# Patient Record
Sex: FEMALE | Race: WHITE | NOT HISPANIC OR LATINO | Employment: UNEMPLOYED | URBAN - METROPOLITAN AREA
[De-identification: names, ages, dates, MRNs, and addresses within clinical notes are randomized per-mention and may not be internally consistent; named-entity substitution may affect disease eponyms.]

---
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
## 2018-01-09 ENCOUNTER — HOSPITAL ENCOUNTER (EMERGENCY)
Facility: MEDICAL CENTER | Age: 18
End: 2018-01-09
Attending: EMERGENCY MEDICINE
Payer: COMMERCIAL

## 2018-01-09 ENCOUNTER — APPOINTMENT (OUTPATIENT)
Dept: RADIOLOGY | Facility: MEDICAL CENTER | Age: 18
End: 2018-01-09
Attending: EMERGENCY MEDICINE
Payer: COMMERCIAL

## 2018-01-09 VITALS
HEIGHT: 65 IN | SYSTOLIC BLOOD PRESSURE: 115 MMHG | TEMPERATURE: 98.3 F | BODY MASS INDEX: 22.44 KG/M2 | OXYGEN SATURATION: 96 % | RESPIRATION RATE: 16 BRPM | HEART RATE: 62 BPM | DIASTOLIC BLOOD PRESSURE: 66 MMHG | WEIGHT: 134.7 LBS

## 2018-01-09 DIAGNOSIS — N12 PYELONEPHRITIS: ICD-10-CM

## 2018-01-09 DIAGNOSIS — R10.9 BILATERAL FLANK PAIN: ICD-10-CM

## 2018-01-09 LAB
APPEARANCE UR: ABNORMAL
BACTERIA #/AREA URNS HPF: ABNORMAL /HPF
BILIRUB UR QL STRIP.AUTO: NEGATIVE
COLOR UR: ABNORMAL
CULTURE IF INDICATED INDCX: YES UA CULTURE
GLUCOSE UR STRIP.AUTO-MCNC: NEGATIVE MG/DL
HCG UR QL: NEGATIVE
KETONES UR STRIP.AUTO-MCNC: NEGATIVE MG/DL
LEUKOCYTE ESTERASE UR QL STRIP.AUTO: NEGATIVE
MICRO URNS: ABNORMAL
NITRITE UR QL STRIP.AUTO: NEGATIVE
PH UR STRIP.AUTO: 6 [PH]
PROT UR QL STRIP: >=500 MG/DL
RBC # URNS HPF: >150 /HPF
RBC UR QL AUTO: ABNORMAL
SP GR UR REFRACTOMETRY: 1.03
SP GR UR STRIP.AUTO: 1.03
UROBILINOGEN UR STRIP.AUTO-MCNC: NORMAL MG/DL
WBC #/AREA URNS HPF: ABNORMAL /HPF

## 2018-01-09 PROCEDURE — 81025 URINE PREGNANCY TEST: CPT

## 2018-01-09 PROCEDURE — 700102 HCHG RX REV CODE 250 W/ 637 OVERRIDE(OP): Performed by: EMERGENCY MEDICINE

## 2018-01-09 PROCEDURE — 87086 URINE CULTURE/COLONY COUNT: CPT

## 2018-01-09 PROCEDURE — 81001 URINALYSIS AUTO W/SCOPE: CPT

## 2018-01-09 PROCEDURE — A9270 NON-COVERED ITEM OR SERVICE: HCPCS | Performed by: EMERGENCY MEDICINE

## 2018-01-09 PROCEDURE — 99284 EMERGENCY DEPT VISIT MOD MDM: CPT

## 2018-01-09 PROCEDURE — 74176 CT ABD & PELVIS W/O CONTRAST: CPT

## 2018-01-09 PROCEDURE — 700111 HCHG RX REV CODE 636 W/ 250 OVERRIDE (IP): Performed by: EMERGENCY MEDICINE

## 2018-01-09 RX ORDER — NAPROXEN 500 MG/1
500 TABLET ORAL 2 TIMES DAILY WITH MEALS
Qty: 20 TAB | Refills: 0 | Status: SHIPPED | OUTPATIENT
Start: 2018-01-09 | End: 2018-01-16

## 2018-01-09 RX ORDER — SULFAMETHOXAZOLE AND TRIMETHOPRIM 800; 160 MG/1; MG/1
1 TABLET ORAL 2 TIMES DAILY
Qty: 20 TAB | Refills: 0 | Status: SHIPPED | OUTPATIENT
Start: 2018-01-09 | End: 2018-01-16

## 2018-01-09 RX ORDER — SULFAMETHOXAZOLE AND TRIMETHOPRIM 800; 160 MG/1; MG/1
1 TABLET ORAL ONCE
Status: COMPLETED | OUTPATIENT
Start: 2018-01-09 | End: 2018-01-09

## 2018-01-09 RX ORDER — OXYCODONE HYDROCHLORIDE AND ACETAMINOPHEN 5; 325 MG/1; MG/1
1 TABLET ORAL ONCE
Status: COMPLETED | OUTPATIENT
Start: 2018-01-09 | End: 2018-01-09

## 2018-01-09 RX ORDER — ONDANSETRON 4 MG/1
4 TABLET, ORALLY DISINTEGRATING ORAL ONCE
Status: COMPLETED | OUTPATIENT
Start: 2018-01-09 | End: 2018-01-09

## 2018-01-09 RX ORDER — PHENAZOPYRIDINE HYDROCHLORIDE 200 MG/1
200 TABLET, FILM COATED ORAL 3 TIMES DAILY PRN
Qty: 6 TAB | Refills: 0 | Status: SHIPPED | OUTPATIENT
Start: 2018-01-09 | End: 2018-01-16

## 2018-01-09 RX ADMIN — SULFAMETHOXAZOLE AND TRIMETHOPRIM 1 TABLET: 800; 160 TABLET ORAL at 19:47

## 2018-01-09 RX ADMIN — OXYCODONE HYDROCHLORIDE AND ACETAMINOPHEN 1 TABLET: 5; 325 TABLET ORAL at 19:47

## 2018-01-09 RX ADMIN — ONDANSETRON 4 MG: 4 TABLET, ORALLY DISINTEGRATING ORAL at 19:47

## 2018-01-09 ASSESSMENT — PAIN SCALES - GENERAL: PAINLEVEL_OUTOF10: 7

## 2018-01-10 NOTE — DISCHARGE INSTRUCTIONS
Return immediately to the Emergency Department if you experience continuing or worsening discomfort in your abdomen or back, fever, chest pain, difficulty breathing or any other new or worsening symptoms.         Pyelonephritis, Adult  Pyelonephritis is a kidney infection. In general, there are 2 main types of pyelonephritis:  · Infections that come on quickly without any warning (acute pyelonephritis).  · Infections that persist for a long period of time (chronic pyelonephritis).  CAUSES   Two main causes of pyelonephritis are:  · Bacteria traveling from the bladder to the kidney. This is a problem especially in pregnant women. The urine in the bladder can become filled with bacteria from multiple causes, including:  ¨ Inflammation of the prostate gland (prostatitis).  ¨ Sexual intercourse in females.  ¨ Bladder infection (cystitis).  · Bacteria traveling from the bloodstream to the tissue part of the kidney.  Problems that may increase your risk of getting a kidney infection include:  · Diabetes.  · Kidney stones or bladder stones.  · Cancer.  · Catheters placed in the bladder.  · Other abnormalities of the kidney or ureter.  SYMPTOMS   · Abdominal pain.  · Pain in the side or flank area.  · Fever.  · Chills.  · Upset stomach.  · Blood in the urine (dark urine).  · Frequent urination.  · Strong or persistent urge to urinate.  · Burning or stinging when urinating.  DIAGNOSIS   Your caregiver may diagnose your kidney infection based on your symptoms. A urine sample may also be taken.  TREATMENT   In general, treatment depends on how severe the infection is.   · If the infection is mild and caught early, your caregiver may treat you with oral antibiotics and send you home.  · If the infection is more severe, the bacteria may have gotten into the bloodstream. This will require intravenous (IV) antibiotics and a hospital stay. Symptoms may include:  ¨ High fever.  ¨ Severe flank pain.  ¨ Shaking chills.  · Even after a  hospital stay, your caregiver may require you to be on oral antibiotics for a period of time.  · Other treatments may be required depending upon the cause of the infection.  HOME CARE INSTRUCTIONS   · Take your antibiotics as directed. Finish them even if you start to feel better.  · Make an appointment to have your urine checked to make sure the infection is gone.  · Drink enough fluids to keep your urine clear or pale yellow.  · Take medicines for the bladder if you have urgency and frequency of urination as directed by your caregiver.  SEEK IMMEDIATE MEDICAL CARE IF:   · You have a fever or persistent symptoms for more than 2-3 days.  · You have a fever and your symptoms suddenly get worse.  · You are unable to take your antibiotics or fluids.  · You develop shaking chills.  · You experience extreme weakness or fainting.  · There is no improvement after 2 days of treatment.  MAKE SURE YOU:  · Understand these instructions.  · Will watch your condition.  · Will get help right away if you are not doing well or get worse.     This information is not intended to replace advice given to you by your health care provider. Make sure you discuss any questions you have with your health care provider.     Document Released: 12/18/2006 Document Revised: 06/18/2013 Document Reviewed: 05/23/2012  [employer] Patient Education ©2016 [employer]

## 2018-01-10 NOTE — ED NOTES
Pt to triage c/o lower abd pain and blood in urine. Pt reports 3 UTIs within the last month. (+) Nausea  Pt advised to return to triage nurse for any changes or concerns.

## 2018-01-10 NOTE — ED NOTES
PT assessment complete. Agree with triage note. Pt c/o lower abd pain and bilateral flank pain. Pt has had multiple UTIs. No n/v/d or fever. PT in gown. Educated on NPO status until cleared by MD. Pt is alert, active, age appropriate, and NAD. No needs. Will continue to monitor.

## 2018-01-10 NOTE — ED PROVIDER NOTES
Patient is a 17-year-old female who comes in for dysuria. She is diagnosed with urinary tract infection pyelonephritis. She is tender to myself to follow-up CT of the abdomen to ensure that she does not have renal calculi. CT returns demonstrates no evidence of renal cocci. Therefore patient is reassured, provided with prescriptions for antibiotics, given strict return precautions. Patient is discharged home in stable condition.    Afshan Griffiths

## 2018-01-10 NOTE — ED PROVIDER NOTES
ED Provider Note    CHIEF COMPLAINT  Chief Complaint   Patient presents with   • Blood in Urine       HPI  Bethany Moreau is a 17 y.o. female who presents For evaluation of blood in her urine and bilateral flank pain, she is a history of recurring UTIs with multiple in the past month, she has been evaluated by a urologist in the past and told her she has reflux but told her there is nothing that can be done for this. The patient is concerned today that she could've kidney stones given the severe back pain and her family history of kidney stones. No fever, no vomiting, she started with symptoms yesterday with just some dysuria and progress today developed the back pain and hematuria. She also describes a suprapubic abdominal pain with this.    REVIEW OF SYSTEMS  Negative for fever, rash, chest pain, dyspnea, vomiting, diarrhea, headache, focal weakness, focal numbness, focal tingling. All other systems are negative.     PAST MEDICAL HISTORY  Past Medical History:   Diagnosis Date   • ASTHMA     exercise induced   • Dental disorder     wearing  braces   • Dislocation of knee    • UTI (urinary tract infection)     previous hx        FAMILY HISTORY  No family history on file.    SOCIAL HISTORY  Social History   Substance Use Topics   • Smoking status: Never Smoker   • Smokeless tobacco: Never Used   • Alcohol use No       SURGICAL HISTORY  Past Surgical History:   Procedure Laterality Date   • SHOULDER ARTHROSCOPY Left 11/18/2016    Procedure: SHOULDER ARTHROSCOPY, Poss Arthrotomy, Subacromial Decompression, Distal Clavicle Excision, Possible Rotator Cuff Repair, Possible Biceps Tenotomy;  Surgeon: Jeremy Sánchez M.D.;  Location: SURGERY [facility];  Service:    • DENTAL EXTRACTION(S)      wisdom teeth   • KNEE ARTHROSCOPY Left     meniscus tear x 2 left knee   • TONSILLECTOMY AND ADENOIDECTOMY         CURRENT MEDICATIONS  I personally reviewed the medication list in the charting documentation.  "    ALLERGIES  No Known Allergies    MEDICAL RECORD  I have reviewed patient's medical record and pertinent results are listed above.      PHYSICAL EXAM  VITAL SIGNS: /83   Pulse 80   Temp 36.8 °C (98.3 °F)   Resp 16   Ht 1.651 m (5' 5\")   Wt 61.1 kg (134 lb 11.2 oz)   SpO2 98%   BMI 22.42 kg/m²    Constitutional: Well appearing patient in no acute distress.  Not toxic, nor ill in appearance.  HENT: Mucus membranes moist.    Eyes: No scleral icterus. Normal conjunctiva   Neck: Supple, comfortable, nonpainful range of motion.   Cardiovascular: Regular heart rate and rhythm.   Thorax & Lungs: Chest is nontender.  Lungs are clear to auscultation with good air movement bilaterally.  No wheeze, rhonchi, nor rales.   Abdomen: Soft, moderate suprapubic tenderness, no rebound, no guarding. Bilateral CVA tenderness.   Skin: Warm, dry. No rash appreciated  Extremities/Musculoskeletal: No sign of trauma. No asymmetric calf tenderness, erythema or edema. Normal range of motion   Neurologic: Alert & oriented. No focal deficits observed.   Psychiatric: Normal affect appropriate for the clinical situation.    DIAGNOSTIC STUDIES / PROCEDURES    LABS  Results for orders placed or performed during the hospital encounter of 01/09/18   URINALYSIS,CULTURE IF INDICATED   Result Value Ref Range    Micro Urine Req Microscopic     Color Red     Character Bloody (A)     Specific Gravity 1.030 <1.035    Ph 6.0 5.0 - 8.0    Glucose Negative Negative mg/dL    Ketones Negative Negative mg/dL    Protein >=500 (A) Negative mg/dL    Bilirubin Negative Negative    Urobilinogen, Urine Normal Negative    Nitrite Negative Negative    Leukocyte Esterase Negative Negative    Occult Blood Large (A) Negative    Culture Indicated Yes UA Culture   URINE MICROSCOPIC (W/UA)   Result Value Ref Range    WBC 20-50 (A) /hpf    RBC >150 (A) /hpf    Bacteria Few (A) None /hpf         RADIOLOGY  CT-RENAL COLIC EVALUATION(A/P W/O)   Final Result       "   1. No urinary tract calculus identified. No renal collecting system in dilatation.      2. No evidence of inflammatory change in the abdomen or pelvis.  The study is however limited due to nonuse of intravenous contrast            COURSE & MEDICAL DECISION MAKING  I have reviewed any medical record information, laboratory studies and radiographic results as noted above.  Differential diagnoses includes: Pyelonephritis, ureterolithiasis    Encounter Summary: This is a 17 y.o. female with signs and symptoms consistent with pyelonephritis, urinalysis supports this with large WBC and some bacteria. She is concerned about kidney stones, although I think it's less likely I will obtain a CT scan to rule this out as the patient is pretty adamant that that's what she has. In the setting of pyelonephritis, a obstructive pathology like ureterolithiasis would be quite concerning so the CT scan will be obtained. In the meantime I will treat her with Bactrim as well as Percocet here in the emergency department and Zofran so she doesn't develop vomiting. If the CT scan is negative for stones, she'll be discharged to follow-up with a urologist as UTIs are recurring. Strict return instructions discussed.      DISPOSITION: Discharge home in stable condition      FINAL IMPRESSION  1. Pyelonephritis    2. Bilateral flank pain           This dictation was created using voice recognition software. The accuracy of the dictation is limited to the abilities of the software. I expect there may be some errors of grammar and possibly content. The nursing notes were reviewed and certain aspects of this information were incorporated into this note.    Electronically signed by: Manny Mckinney, 1/9/2018 7:41 PM

## 2018-01-11 ENCOUNTER — PATIENT OUTREACH (OUTPATIENT)
Dept: HEALTH INFORMATION MANAGEMENT | Facility: OTHER | Age: 18
End: 2018-01-11

## 2018-01-11 LAB
BACTERIA UR CULT: NORMAL
SIGNIFICANT IND 70042: NORMAL
SITE SITE: NORMAL
SOURCE SOURCE: NORMAL

## 2018-01-12 ENCOUNTER — HOSPITAL ENCOUNTER (EMERGENCY)
Facility: MEDICAL CENTER | Age: 18
End: 2018-01-12
Attending: EMERGENCY MEDICINE
Payer: COMMERCIAL

## 2018-01-12 ENCOUNTER — APPOINTMENT (OUTPATIENT)
Dept: RADIOLOGY | Facility: MEDICAL CENTER | Age: 18
End: 2018-01-12
Attending: EMERGENCY MEDICINE
Payer: COMMERCIAL

## 2018-01-12 VITALS
HEART RATE: 64 BPM | TEMPERATURE: 98.3 F | HEIGHT: 65 IN | RESPIRATION RATE: 16 BRPM | OXYGEN SATURATION: 97 % | BODY MASS INDEX: 23.07 KG/M2 | WEIGHT: 138.45 LBS | DIASTOLIC BLOOD PRESSURE: 61 MMHG | SYSTOLIC BLOOD PRESSURE: 109 MMHG

## 2018-01-12 DIAGNOSIS — R10.84 GENERALIZED ABDOMINAL PAIN: ICD-10-CM

## 2018-01-12 DIAGNOSIS — N93.8 DYSFUNCTIONAL UTERINE BLEEDING: ICD-10-CM

## 2018-01-12 DIAGNOSIS — N93.9 VAGINAL BLEEDING: ICD-10-CM

## 2018-01-12 LAB
ALBUMIN SERPL BCP-MCNC: 4.4 G/DL (ref 3.2–4.9)
ALBUMIN/GLOB SERPL: 1.3 G/DL
ALP SERPL-CCNC: 44 U/L (ref 30–99)
ALT SERPL-CCNC: 15 U/L (ref 2–50)
ANION GAP SERPL CALC-SCNC: 6 MMOL/L (ref 0–11.9)
AST SERPL-CCNC: 21 U/L (ref 12–45)
B-HCG SERPL-ACNC: <0.6 MIU/ML (ref 0–5)
BASOPHILS # BLD AUTO: 0.4 % (ref 0–1.8)
BASOPHILS # BLD: 0.02 K/UL (ref 0–0.12)
BILIRUB SERPL-MCNC: 0.7 MG/DL (ref 0.1–1.5)
BUN SERPL-MCNC: 11 MG/DL (ref 8–22)
CALCIUM SERPL-MCNC: 9.2 MG/DL (ref 8.5–10.5)
CHLORIDE SERPL-SCNC: 106 MMOL/L (ref 96–112)
CO2 SERPL-SCNC: 23 MMOL/L (ref 20–33)
CREAT SERPL-MCNC: 0.83 MG/DL (ref 0.5–1.4)
EOSINOPHIL # BLD AUTO: 0.06 K/UL (ref 0–0.51)
EOSINOPHIL NFR BLD: 1.1 % (ref 0–6.9)
ERYTHROCYTE [DISTWIDTH] IN BLOOD BY AUTOMATED COUNT: 39.8 FL (ref 35.9–50)
GLOBULIN SER CALC-MCNC: 3.4 G/DL (ref 1.9–3.5)
GLUCOSE SERPL-MCNC: 94 MG/DL (ref 65–99)
HCT VFR BLD AUTO: 47.3 % (ref 37–47)
HGB BLD-MCNC: 15.9 G/DL (ref 12–16)
IMM GRANULOCYTES # BLD AUTO: 0.01 K/UL (ref 0–0.11)
IMM GRANULOCYTES NFR BLD AUTO: 0.2 % (ref 0–0.9)
INR PPP: 0.98 (ref 0.87–1.13)
LIPASE SERPL-CCNC: 46 U/L (ref 11–82)
LYMPHOCYTES # BLD AUTO: 1.76 K/UL (ref 1–4.8)
LYMPHOCYTES NFR BLD: 32.4 % (ref 22–41)
MCH RBC QN AUTO: 29.1 PG (ref 27–33)
MCHC RBC AUTO-ENTMCNC: 33.6 G/DL (ref 33.6–35)
MCV RBC AUTO: 86.6 FL (ref 81.4–97.8)
MONOCYTES # BLD AUTO: 0.47 K/UL (ref 0–0.85)
MONOCYTES NFR BLD AUTO: 8.7 % (ref 0–13.4)
NEUTROPHILS # BLD AUTO: 3.11 K/UL (ref 2–7.15)
NEUTROPHILS NFR BLD: 57.2 % (ref 44–72)
NRBC # BLD AUTO: 0 K/UL
NRBC BLD-RTO: 0 /100 WBC
NUMBER OF RH DOSES IND 8505RD: NORMAL
PLATELET # BLD AUTO: 313 K/UL (ref 164–446)
PMV BLD AUTO: 9.8 FL (ref 9–12.9)
POTASSIUM SERPL-SCNC: 4.3 MMOL/L (ref 3.6–5.5)
PROT SERPL-MCNC: 7.8 G/DL (ref 6–8.2)
PROTHROMBIN TIME: 12.7 SEC (ref 12–14.6)
RBC # BLD AUTO: 5.46 M/UL (ref 4.2–5.4)
RH BLD: NORMAL
SODIUM SERPL-SCNC: 135 MMOL/L (ref 135–145)
WBC # BLD AUTO: 5.4 K/UL (ref 4.8–10.8)

## 2018-01-12 PROCEDURE — 700111 HCHG RX REV CODE 636 W/ 250 OVERRIDE (IP): Performed by: EMERGENCY MEDICINE

## 2018-01-12 PROCEDURE — 86901 BLOOD TYPING SEROLOGIC RH(D): CPT

## 2018-01-12 PROCEDURE — 96375 TX/PRO/DX INJ NEW DRUG ADDON: CPT

## 2018-01-12 PROCEDURE — 85025 COMPLETE CBC W/AUTO DIFF WBC: CPT

## 2018-01-12 PROCEDURE — 99284 EMERGENCY DEPT VISIT MOD MDM: CPT

## 2018-01-12 PROCEDURE — 96374 THER/PROPH/DIAG INJ IV PUSH: CPT

## 2018-01-12 PROCEDURE — 76830 TRANSVAGINAL US NON-OB: CPT

## 2018-01-12 PROCEDURE — 84702 CHORIONIC GONADOTROPIN TEST: CPT

## 2018-01-12 PROCEDURE — 700105 HCHG RX REV CODE 258: Performed by: EMERGENCY MEDICINE

## 2018-01-12 PROCEDURE — 85610 PROTHROMBIN TIME: CPT

## 2018-01-12 PROCEDURE — 83690 ASSAY OF LIPASE: CPT

## 2018-01-12 PROCEDURE — 80053 COMPREHEN METABOLIC PANEL: CPT

## 2018-01-12 PROCEDURE — 36415 COLL VENOUS BLD VENIPUNCTURE: CPT

## 2018-01-12 PROCEDURE — 96361 HYDRATE IV INFUSION ADD-ON: CPT

## 2018-01-12 RX ORDER — SODIUM CHLORIDE 9 MG/ML
INJECTION, SOLUTION INTRAVENOUS CONTINUOUS
Status: DISCONTINUED | OUTPATIENT
Start: 2018-01-12 | End: 2018-01-12 | Stop reason: HOSPADM

## 2018-01-12 RX ORDER — ONDANSETRON 2 MG/ML
4 INJECTION INTRAMUSCULAR; INTRAVENOUS ONCE
Status: COMPLETED | OUTPATIENT
Start: 2018-01-12 | End: 2018-01-12

## 2018-01-12 RX ORDER — HYDROMORPHONE HYDROCHLORIDE 2 MG/ML
1 INJECTION, SOLUTION INTRAMUSCULAR; INTRAVENOUS; SUBCUTANEOUS ONCE
Status: COMPLETED | OUTPATIENT
Start: 2018-01-12 | End: 2018-01-12

## 2018-01-12 RX ADMIN — SODIUM CHLORIDE: 9 INJECTION, SOLUTION INTRAVENOUS at 14:00

## 2018-01-12 RX ADMIN — HYDROMORPHONE HYDROCHLORIDE 1 MG: 2 INJECTION INTRAMUSCULAR; INTRAVENOUS; SUBCUTANEOUS at 13:58

## 2018-01-12 RX ADMIN — ONDANSETRON 4 MG: 2 INJECTION INTRAMUSCULAR; INTRAVENOUS at 13:56

## 2018-01-12 ASSESSMENT — LIFESTYLE VARIABLES: DO YOU DRINK ALCOHOL: NO

## 2018-01-12 NOTE — ED NOTES
Bethany Moreau 17 y.o. female     Chief Complaint   Patient presents with   • Abdominal Pain     Seen here 1/9 for blood in urine.  Dx with kidney infection, referred to urlolgy.  Seen by [facility], no infection present per urologist.  Referred to GI, who reviewed CT and findings uremarkable.       • Vaginal Bleeding     Clots - started this AM.  Sent for pelvic workup.      Pt returned to [facility] and educated on triage process.  Advised to notify RN with changes or concerns.

## 2018-01-12 NOTE — PROGRESS NOTES
"01/11/2018 1636 - Discharge Outreach - received call from patient's mother. States she has been treated 3 times recently for UTI. Was referred to urologist  - saw urology PA today and he did a dipstick and said she doesn't have a UTI and should stop the abx. Mother states patient still in a lot of pain and \"peeing blood\". Encouraged mother to call urology office to discuss. Support given. States she is also having GI problems. Recommended that patient establish with PCP. No other questions.   "

## 2018-01-12 NOTE — ED PROVIDER NOTES
ED Provider Note  CHIEF COMPLAINT  Chief Complaint   Patient presents with   • Abdominal Pain     Seen here 1/9 for blood in urine.  Dx with kidney infection, referred to urlolgy.  Seen by url[facility], no infection present per urologist.  Referred to GI, who reviewed CT and findings uremarkable.       • Vaginal Bleeding     Clots - started this AM.  Sent for pelvic workup.       HPI  Bethany Moreau is a 17 y.o. female who presents claiming of intermittent crampy lower abdominal and sometimes upper abdominal pain associated with some vaginal bleeding and blood clots.He said a recent evaluation by her GI specialist she's having no GI bleeding. She also had evaluation by her and her urologist and she has no kidney infection. She complains of persistent abdominal cramps and she thinks she's having some vaginal bleeding. She was sent over here for a gynecological evaluation. She does have a history of gastroparesis.    REVIEW OF SYSTEMS  No headache, no chest pain. No melena. No blood in her bowel movement. No urinary symptoms.  ALL OTHER SYSTEMS NEGATIVE    ALLERGIES  No allergies      PAST MEDICAL HISTORY  Past Medical History:   Diagnosis Date   • ASTHMA     exercise induced   • Dental disorder     wearing  braces   • Dislocation of knee    • UTI (urinary tract infection)     previous hx        SURGICAL HISTORY  Past Surgical History:   Procedure Laterality Date   • SHOULDER ARTHROSCOPY Left 11/18/2016    Procedure: SHOULDER ARTHROSCOPY, Poss Arthrotomy, Subacromial Decompression, Distal Clavicle Excision, Possible Rotator Cuff Repair, Possible Biceps Tenotomy;  Surgeon: Jeremy Sánchez M.D.;  Location: SURGERY [facility];  Service:    • DENTAL EXTRACTION(S)      wisdom teeth   • KNEE ARTHROSCOPY Left     meniscus tear x 2 left knee   • TONSILLECTOMY AND ADENOIDECTOMY         FAMILY HISTORY  No family history on file.    SOCIAL HISTORY  Social History     Social History   • Marital status: Single     Spouse  "name: N/A   • Number of children: N/A   • Years of education: N/A     Social History Main Topics   • Smoking status: Never Smoker   • Smokeless tobacco: Never Used   • Alcohol use No   • Drug use: No   • Sexual activity: Not on file     Other Topics Concern   • Not on file     Social History Narrative   • No narrative on file       PHYSICAL EXAM  GENERAL:Alert female adult  VITAL SIGNS: /79   Pulse 82   Temp 36.8 °C (98.3 °F) (Temporal)   Resp 18   Ht 1.651 m (5' 5\")   Wt 62.8 kg (138 lb 7.2 oz)   SpO2 99%   BMI 23.04 kg/m²   Constitutional: Alert healthy-appearing adult   HENT: Scalp is normal size and nontender. Ears are clear. Nose is clear. Throat is clear with no stridor no drooling no trismus. Teeth are all intact.  Eyes: Pupils equal round and reactive to light, extraocular motor fall. There is no scleral icterus.  Neck: Neck is supple and nontender. There is no meningismus. No adenitis. No thyromegaly.  Lymphatic: No adenopathy.   Cardiovascular: Heart regular rhythm without murmurs or gallops   Thorax & Lungs: No chest wall tenderness. Lungs are clear. Patient has good breath sounds bilateral. No rales, no rhonchi, no wheezes.  Abdomen: Abdomen is soft, nontender, not rigid, no guarding, and no organomegaly. There is no palpable hernia   Skin: Warm, pink, and dry with no erythema and no rash.   Back: Nontender, no midline bony tenderness, no flank tenderness.  Pelvic examination: The cervix is firm. The uterus is normal size. No uterine tenderness. No adnexal masses. Just a small amount of blood in the vagina.  Extremities: Full range of motion  No tenderness to palpation and no deformities noted. No calf or thigh swelling. No calf or thigh tenderness. No clinical DVT.  Neurologic: Alert & oriented . Cranial nerves are grossly intact as tested. Patient moves all 4 extremities well. Patient has good strong flexion and extension of the ankle joints knee joints hip joints and elbow joints. " Sensation is normal and symmetrical in the upper and lower extremities.   Psychiatric: Patient is alert oriented coherent and rational.       RADIOLOGY/PROCEDURES  US-GYN-PELVIS TRANSVAGINAL   Final Result      1.  Normal transvaginal appearance of the uterus and right ovary.            COURSE & MEDICAL DECISION MAKING  Differential diagnosis: Vaginal bleeding, threatened AB, pregnancy, dysfunctional uterine bleeding, other causes of abdominal discomfort, etc.    Plan: #1 IV #2 some IV fluids for the vaginal bleeding and she does appear somewhat dehydrated with dry mucous membranes. #3. Laboratory including CBC, CMP, pro time, quantitative hCG, Rh. #4. Pelvic ultrasound #5. Pelvic examination    Laboratory and reexamination: Pelvic ultrasound is normal. Her pelvic exam is normal except for some mild vaginal bleeding. CBC is normal. No anemia. No bandemia. Chemistry panel normal. No renal or liver dysfunction. HCG is negative. Not pregnant.    Results for orders placed or performed during the hospital encounter of 01/12/18   CBC WITH DIFFERENTIAL   Result Value Ref Range    WBC 5.4 4.8 - 10.8 K/uL    RBC 5.46 (H) 4.20 - 5.40 M/uL    Hemoglobin 15.9 12.0 - 16.0 g/dL    Hematocrit 47.3 (H) 37.0 - 47.0 %    MCV 86.6 81.4 - 97.8 fL    MCH 29.1 27.0 - 33.0 pg    MCHC 33.6 33.6 - 35.0 g/dL    RDW 39.8 35.9 - 50.0 fL    Platelet Count 313 164 - 446 K/uL    MPV 9.8 9.0 - 12.9 fL    Neutrophils-Polys 57.20 44.00 - 72.00 %    Lymphocytes 32.40 22.00 - 41.00 %    Monocytes 8.70 0.00 - 13.40 %    Eosinophils 1.10 0.00 - 6.90 %    Basophils 0.40 0.00 - 1.80 %    Immature Granulocytes 0.20 0.00 - 0.90 %    Nucleated RBC 0.00 /100 WBC    Neutrophils (Absolute) 3.11 2.00 - 7.15 K/uL    Lymphs (Absolute) 1.76 1.00 - 4.80 K/uL    Monos (Absolute) 0.47 0.00 - 0.85 K/uL    Eos (Absolute) 0.06 0.00 - 0.51 K/uL    Baso (Absolute) 0.02 0.00 - 0.12 K/uL    Immature Granulocytes (abs) 0.01 0.00 - 0.11 K/uL    NRBC (Absolute) 0.00 K/uL   COMP  METABOLIC PANEL   Result Value Ref Range    Sodium 135 135 - 145 mmol/L    Potassium 4.3 3.6 - 5.5 mmol/L    Chloride 106 96 - 112 mmol/L    Co2 23 20 - 33 mmol/L    Anion Gap 6.0 0.0 - 11.9    Glucose 94 65 - 99 mg/dL    Bun 11 8 - 22 mg/dL    Creatinine 0.83 0.50 - 1.40 mg/dL    Calcium 9.2 8.5 - 10.5 mg/dL    AST(SGOT) 21 12 - 45 U/L    ALT(SGPT) 15 2 - 50 U/L    Alkaline Phosphatase 44 30 - 99 U/L    Total Bilirubin 0.7 0.1 - 1.5 mg/dL    Albumin 4.4 3.2 - 4.9 g/dL    Total Protein 7.8 6.0 - 8.2 g/dL    Globulin 3.4 1.9 - 3.5 g/dL    A-G Ratio 1.3 g/dL   RH TYPE FOR RHOGAM FROM E.D.   Result Value Ref Range    Emergency Department Rh Typing POS     Number Of Rh Doses Indicated ZERO    HCG QUANTITATIVE SERUM   Result Value Ref Range    Bhcg <0.6 0.0 - 5.0 mIU/mL   PROTHROMBIN TIME (INR)   Result Value Ref Range    PT 12.7 12.0 - 14.6 sec    INR 0.98 0.87 - 1.13   LIPASE   Result Value Ref Range    Lipase 46 11 - 82 U/L   ESTIMATED GFR   Result Value Ref Range    GFR If African American >60 >60 mL/min/1.73 m 2    GFR If Non African American >60 >60 mL/min/1.73 m 2      Patient hemodynamically stable. She has some dysfunctional uterine bleeding. She is stable for discharge.    Treatment: #1 she has been given a copy of all of her reports #2. She'll follow-up with her family physician gynecologist. Given instructions on dysfunctional uterine bleeding.  FINAL IMPRESSION  1. Vaginal bleeding  2. Abdominal pain 3. Dysfunctional uterine bleeding         Electronically signed by: Gary Gansert, 1/12/2018 /3030 p.m.

## 2018-01-12 NOTE — ED NOTES
PIV dc'ed  Reviewed all discharge instructions with patient, discussed follow up, Pt denies questions. Pt escorted to lobby.

## 2018-05-30 ENCOUNTER — APPOINTMENT (OUTPATIENT)
Dept: NEPHROLOGY | Facility: MEDICAL CENTER | Age: 18
End: 2018-05-30
Payer: COMMERCIAL

## 2018-06-09 ENCOUNTER — OFFICE VISIT (OUTPATIENT)
Dept: URGENT CARE | Facility: CLINIC | Age: 18
End: 2018-06-09
Payer: COMMERCIAL

## 2018-06-09 VITALS
HEART RATE: 100 BPM | WEIGHT: 145 LBS | TEMPERATURE: 98.6 F | OXYGEN SATURATION: 98 % | SYSTOLIC BLOOD PRESSURE: 110 MMHG | DIASTOLIC BLOOD PRESSURE: 60 MMHG | RESPIRATION RATE: 18 BRPM | BODY MASS INDEX: 24.13 KG/M2

## 2018-06-09 DIAGNOSIS — R50.9 FEVER, UNSPECIFIED FEVER CAUSE: ICD-10-CM

## 2018-06-09 DIAGNOSIS — B34.9 VIREMIA: ICD-10-CM

## 2018-06-09 DIAGNOSIS — R51.9 ACUTE NONINTRACTABLE HEADACHE, UNSPECIFIED HEADACHE TYPE: ICD-10-CM

## 2018-06-09 DIAGNOSIS — R11.2 NON-INTRACTABLE VOMITING WITH NAUSEA, UNSPECIFIED VOMITING TYPE: ICD-10-CM

## 2018-06-09 DIAGNOSIS — J02.9 PHARYNGITIS, UNSPECIFIED ETIOLOGY: ICD-10-CM

## 2018-06-09 LAB
INT CON NEG: NEGATIVE
INT CON POS: POSITIVE
S PYO AG THROAT QL: NORMAL

## 2018-06-09 PROCEDURE — 99214 OFFICE O/P EST MOD 30 MIN: CPT | Performed by: NURSE PRACTITIONER

## 2018-06-09 PROCEDURE — 87880 STREP A ASSAY W/OPTIC: CPT | Performed by: NURSE PRACTITIONER

## 2018-06-09 RX ORDER — PROMETHAZINE HYDROCHLORIDE 25 MG/1
25 TABLET ORAL EVERY 6 HOURS PRN
Qty: 10 TAB | Refills: 0 | Status: SHIPPED | OUTPATIENT
Start: 2018-06-09 | End: 2018-06-12

## 2018-06-09 RX ORDER — KETOROLAC TROMETHAMINE 30 MG/ML
60 INJECTION, SOLUTION INTRAMUSCULAR; INTRAVENOUS ONCE
Status: COMPLETED | OUTPATIENT
Start: 2018-06-09 | End: 2018-06-09

## 2018-06-09 RX ORDER — ONDANSETRON 4 MG/1
4 TABLET, ORALLY DISINTEGRATING ORAL ONCE
Status: COMPLETED | OUTPATIENT
Start: 2018-06-09 | End: 2018-06-09

## 2018-06-09 RX ADMIN — KETOROLAC TROMETHAMINE 60 MG: 30 INJECTION, SOLUTION INTRAMUSCULAR; INTRAVENOUS at 11:00

## 2018-06-09 RX ADMIN — ONDANSETRON 4 MG: 4 TABLET, ORALLY DISINTEGRATING ORAL at 11:03

## 2018-06-09 NOTE — PATIENT INSTRUCTIONS
"Viremia  Your exam shows you have a viral illness. Viremia means your symptoms are due to the presence of the virus in your blood. This will often cause a chill or sweat. Other common symptoms of viral infections include fever, muscle aches, headache, fatigue, stomach upsets, sore throat, and dry cough. Antibiotics are not effective in viral illnesses; they are usually only given when there is a secondary bacterial infection.  General treatment includes bed rest, increasing oral fluid intake of clear, non-caffeinated drinks like ginger ale, fruit juices, water, or sports drinks. Medicines to relieve specific symptoms such as cough, pain, or diarrhea may also be prescribed. Only take over-the-counter or prescription medicines for pain, discomfort, or fever as directed by your caregiver.   Please call your doctor if you are not better after 2 to 3 days of symptom treatment. Call or return here right away if your illness gets more severe, or you develop any other new symptoms, such as a fever above 103° F (39.4° C), vomiting for more than a day, severe headache or other pain, stiff neck, trouble breathing, visual problems, \"blackouts\" or fainting.  Document Released: 01/25/2006 Document Revised: 03/11/2013 Document Reviewed: 12/18/2006  [employer]® Patient Information ©2013 [employer].    "

## 2018-06-10 ASSESSMENT — ENCOUNTER SYMPTOMS
NAUSEA: 1
BACK PAIN: 0
DIZZINESS: 0
DIAPHORESIS: 0
FEVER: 1
SINUS PAIN: 0
ABDOMINAL PAIN: 0
WEAKNESS: 0
SORE THROAT: 1
DIARRHEA: 0
CHILLS: 0
MYALGIAS: 0
VOMITING: 1
HEADACHES: 1
NECK PAIN: 0
COUGH: 0

## 2018-06-10 NOTE — PROGRESS NOTES
Subjective:      Bethany Moreau is a 17 y.o. female who presents with Migraine (fever, vomiting, light sensative and ear pain, began yesterday am )            Patient comes in today with a 1 day history of intensely sore throat, nausea with vomiting, frontal headache with photosensitivity, otalgia, and fever up to 102 at home.  She notes right lateral rib soreness secondary to vomiting.  She denies any history of migraines.  Not the worst headache of her life.  She denies any chest pain or shortness of breath.          Review of Systems   Constitutional: Positive for fever and malaise/fatigue. Negative for chills and diaphoresis.   HENT: Positive for ear pain and sore throat. Negative for congestion and sinus pain.    Respiratory: Negative for cough.    Gastrointestinal: Positive for nausea and vomiting. Negative for abdominal pain and diarrhea.   Musculoskeletal: Negative for back pain, myalgias and neck pain.   Skin: Negative for rash.   Neurological: Positive for headaches. Negative for dizziness and weakness.     Medications, Allergies, and current problem list reviewed today in Epic     Objective:     /60   Pulse 100   Temp 37 °C (98.6 °F)   Resp 18   Wt 65.8 kg (145 lb)   SpO2 98%   Breastfeeding? No   BMI 24.13 kg/m²      Physical Exam   Constitutional: She is oriented to person, place, and time. She appears well-developed and well-nourished. She appears distressed.   Patient is seated in darkened room due to light sensitive headache.  She appears fatigued but non-toxic.  Afebrile in clinic.   HENT:   Head: Normocephalic.   Right Ear: External ear normal.   Left Ear: External ear normal.   Nose: Nose normal.   Mouth/Throat: No oropharyngeal exudate.   Oropharyngeal erythema with no exudate or edema.  Uvula midline.   Eyes: Conjunctivae are normal. Pupils are equal, round, and reactive to light. Right eye exhibits no discharge. Left eye exhibits no discharge. No scleral icterus.   Neck: Normal  range of motion. Neck supple. No JVD present. No tracheal deviation present. No thyromegaly present.   Cardiovascular: Regular rhythm and normal heart sounds.  Exam reveals no gallop and no friction rub.    No murmur heard.  Tachycardia noted.   Pulmonary/Chest: Effort normal and breath sounds normal. No stridor. No respiratory distress. She has no wheezes. She has no rales. She exhibits no tenderness.   Abdominal: Soft. Bowel sounds are normal. She exhibits no distension and no mass. There is no tenderness.   Musculoskeletal: She exhibits no edema.   Lymphadenopathy:     She has no cervical adenopathy.   Neurological: She is alert and oriented to person, place, and time. No cranial nerve deficit or sensory deficit. She exhibits normal muscle tone.   Skin: Skin is warm and dry. No rash noted. She is not diaphoretic. No erythema.   Vitals reviewed.    POCT rapid strep a: negative  IN clinic medications:  Toradol 60 mg IM.  Patient tolerated well  Zofran 4 mg ODT.  Patient tolerated well.  NO vomiting or heaving in clinic.            Assessment/Plan:     1. Pharyngitis, unspecified etiology  - POCT Rapid Strep A    2. Acute nonintractable headache, unspecified headache type  - ketorolac (TORADOL) injection 60 mg; 2 mL by Intramuscular route Once.    3. Non-intractable vomiting with nausea, unspecified vomiting type  - ondansetron (ZOFRAN ODT) dispertab 4 mg; Take 1 Tab by mouth Once.  - promethazine (PHENERGAN) 25 MG Tab; Take 1 Tab by mouth every 6 hours as needed for Nausea/Vomiting for up to 3 days.  Dispense: 10 Tab; Refill: 0    4. Fever, unspecified fever cause  - POCT Rapid Strep A    5. Viremia    Advised patient that based on the history and exam findings, this is likely a self-limiting viral illness.  There is no indication for antibiotics at this time.  Differential reviewed.    OTC NSAIDs or tylenol prn fever, pain.  Patient has Zofran left over at home.  Will also prescribe Phenergan for  nausea/vomiting.  Maintain adequate po hydration.  RTC in 5-7 days if symptoms persist, sooner if worse.  ED precautions discussed.  Patient verbalized understanding of and agreed with plan of care.

## 2018-08-02 ENCOUNTER — HOSPITAL ENCOUNTER (OUTPATIENT)
Facility: MEDICAL CENTER | Age: 18
End: 2018-08-02
Attending: INTERNAL MEDICINE
Payer: COMMERCIAL

## 2018-08-02 PROCEDURE — 82570 ASSAY OF URINE CREATININE: CPT

## 2018-08-02 PROCEDURE — 81050 URINALYSIS VOLUME MEASURE: CPT

## 2018-08-02 PROCEDURE — 83945 ASSAY OF OXALATE: CPT

## 2018-08-02 PROCEDURE — 82575 CREATININE CLEARANCE TEST: CPT

## 2018-08-02 PROCEDURE — 82507 ASSAY OF CITRATE: CPT

## 2018-08-02 PROCEDURE — 84300 ASSAY OF URINE SODIUM: CPT

## 2018-08-02 PROCEDURE — 84105 ASSAY OF URINE PHOSPHORUS: CPT

## 2018-08-02 PROCEDURE — 84560 ASSAY OF URINE/URIC ACID: CPT

## 2018-08-02 PROCEDURE — 36415 COLL VENOUS BLD VENIPUNCTURE: CPT

## 2018-08-02 PROCEDURE — 84156 ASSAY OF PROTEIN URINE: CPT

## 2018-08-02 PROCEDURE — 82340 ASSAY OF CALCIUM IN URINE: CPT

## 2018-08-03 ENCOUNTER — HOSPITAL ENCOUNTER (OUTPATIENT)
Dept: LAB | Facility: MEDICAL CENTER | Age: 18
End: 2018-08-03
Attending: INTERNAL MEDICINE
Payer: COMMERCIAL

## 2018-08-03 LAB
ALBUMIN SERPL BCP-MCNC: 4.5 G/DL (ref 3.2–4.9)
BASOPHILS # BLD AUTO: 0.6 % (ref 0–1.8)
BASOPHILS # BLD: 0.03 K/UL (ref 0–0.12)
BUN SERPL-MCNC: 7 MG/DL (ref 8–22)
C3 SERPL-MCNC: 126 MG/DL (ref 87–200)
C4 SERPL-MCNC: 15 MG/DL (ref 19–52)
CALCIUM SERPL-MCNC: 9.3 MG/DL (ref 8.5–10.5)
CHLORIDE SERPL-SCNC: 108 MMOL/L (ref 96–112)
CO2 SERPL-SCNC: 26 MMOL/L (ref 20–33)
COLLECT DURATION TIME UR: 24 HR
CREAT CL/1.73 SQ M ?TM UR+SERPL-ARVRAT: 161 ML/MIN (ref 88–128)
CREAT SERPL-MCNC: 0.73 MG/DL (ref 0.5–1.4)
CREAT SERPL-MCNC: 0.73 MG/DL (ref 0.5–1.4)
CREAT UR-MCNC: 236.9 MG/DL
EOSINOPHIL # BLD AUTO: 0.06 K/UL (ref 0–0.51)
EOSINOPHIL NFR BLD: 1.2 % (ref 0–6.9)
ERYTHROCYTE [DISTWIDTH] IN BLOOD BY AUTOMATED COUNT: 41.6 FL (ref 35.9–50)
GLUCOSE SERPL-MCNC: 76 MG/DL (ref 65–99)
HCT VFR BLD AUTO: 43 % (ref 37–47)
HGB BLD-MCNC: 14.5 G/DL (ref 12–16)
IMM GRANULOCYTES # BLD AUTO: 0.01 K/UL (ref 0–0.11)
IMM GRANULOCYTES NFR BLD AUTO: 0.2 % (ref 0–0.9)
LYMPHOCYTES # BLD AUTO: 1.91 K/UL (ref 1–4.8)
LYMPHOCYTES NFR BLD: 38.2 % (ref 22–41)
MCH RBC QN AUTO: 29.8 PG (ref 27–33)
MCHC RBC AUTO-ENTMCNC: 33.7 G/DL (ref 33.6–35)
MCV RBC AUTO: 88.3 FL (ref 81.4–97.8)
MONOCYTES # BLD AUTO: 0.36 K/UL (ref 0–0.85)
MONOCYTES NFR BLD AUTO: 7.2 % (ref 0–13.4)
NEUTROPHILS # BLD AUTO: 2.63 K/UL (ref 2–7.15)
NEUTROPHILS NFR BLD: 52.6 % (ref 44–72)
NRBC # BLD AUTO: 0 K/UL
NRBC BLD-RTO: 0 /100 WBC
PHOSPHATE SERPL-MCNC: 2.9 MG/DL (ref 2.5–4.5)
PLATELET # BLD AUTO: 311 K/UL (ref 164–446)
PMV BLD AUTO: 10.3 FL (ref 9–12.9)
POTASSIUM SERPL-SCNC: 4.2 MMOL/L (ref 3.6–5.5)
PROT 24H UR-MCNC: 88.9 MG/24 HR (ref 30–150)
PROT 24H UR-MRATE: 12.7 MG/DL (ref 0–15)
PTH-INTACT SERPL-MCNC: 36.7 PG/ML (ref 14–72)
RBC # BLD AUTO: 4.87 M/UL (ref 4.2–5.4)
SODIUM SERPL-SCNC: 141 MMOL/L (ref 135–145)
SPECIMEN VOL UR: 700 ML
SPECIMEN VOL UR: 700 ML
URATE SERPL-MCNC: 4.8 MG/DL (ref 1.9–8.2)
URINE CREATININE EXCRETED 1125: 1658 MG/24 HR
WBC # BLD AUTO: 5 K/UL (ref 4.8–10.8)

## 2018-08-03 PROCEDURE — 82784 ASSAY IGA/IGD/IGG/IGM EACH: CPT

## 2018-08-03 PROCEDURE — 86334 IMMUNOFIX E-PHORESIS SERUM: CPT

## 2018-08-03 PROCEDURE — 83970 ASSAY OF PARATHORMONE: CPT

## 2018-08-03 PROCEDURE — 84550 ASSAY OF BLOOD/URIC ACID: CPT

## 2018-08-03 PROCEDURE — 84165 PROTEIN E-PHORESIS SERUM: CPT

## 2018-08-03 PROCEDURE — 86160 COMPLEMENT ANTIGEN: CPT

## 2018-08-03 PROCEDURE — 80069 RENAL FUNCTION PANEL: CPT

## 2018-08-03 PROCEDURE — 84160 ASSAY OF PROTEIN ANY SOURCE: CPT

## 2018-08-03 PROCEDURE — 85025 COMPLETE CBC W/AUTO DIFF WBC: CPT

## 2018-08-03 PROCEDURE — 36415 COLL VENOUS BLD VENIPUNCTURE: CPT

## 2018-08-04 LAB
CALCIUM 24H UR-MCNC: 5 MG/DL
CALCIUM 24H UR-MRATE: 35 MG/D
CALCIUM/CREAT 24H UR: 23 MG/G (ref 20–300)
CITRATE 24H UR-MCNC: 698 MG/L
CITRATE 24H UR-MRATE: 489 MG/D (ref 320–1240)
COLLECT DURATION TIME SPEC: 24 HRS
COLLECT DURATION TIME SPEC: 24 HRS
CREAT 24H UR-MCNC: 214 MG/DL
CREAT 24H UR-MRATE: 1498 MG/D (ref 700–1600)
PHOSPHATE 24H UR-MCNC: 126 MG/DL
PHOSPHATE 24H UR-MRATE: 882 MG/D (ref 400–1300)
PHOSPHATE/CREAT 24H UR: 589 MG/G
SODIUM 24H UR-SCNC: 201 MMOL/L
SODIUM 24H UR-SRATE: 141 MMOL/D (ref 51–286)
SPECIMEN VOL ?TM UR: 700 ML
TOTAL VOLUME 1105: 700 ML
URATE 24H UR-MCNC: 76.4 MG/DL
URATE 24H UR-MRATE: 535 MG/D (ref 250–750)

## 2018-08-06 LAB
ALBUMIN SERPL-MCNC: 4.2 G/DL (ref 3.75–5.01)
ALPHA1 GLOB SERPL ELPH-MCNC: 0.29 G/DL (ref 0.19–0.46)
ALPHA2 GLOB SERPL ELPH-MCNC: 0.67 G/DL (ref 0.48–1.05)
B-GLOBULIN SERPL ELPH-MCNC: 0.8 G/DL (ref 0.48–1.1)
GAMMA GLOB SERPL ELPH-MCNC: 1.33 G/DL (ref 0.62–1.51)
IGA SERPL-MCNC: 231 MG/DL (ref 68–408)
IGG SERPL-MCNC: 1360 MG/DL (ref 768–1632)
IGM SERPL-MCNC: 276 MG/DL (ref 35–263)
INTERPRETATION SERPL IFE-IMP: ABNORMAL
INTERPRETATION SERPL IFE-IMP: ABNORMAL
PATHOLOGY STUDY: ABNORMAL
PROT SERPL-MCNC: 7.3 G/DL (ref 6–8.3)

## 2019-02-14 ENCOUNTER — APPOINTMENT (OUTPATIENT)
Dept: RADIOLOGY | Facility: MEDICAL CENTER | Age: 19
End: 2019-02-14
Attending: EMERGENCY MEDICINE
Payer: COMMERCIAL

## 2019-02-14 ENCOUNTER — HOSPITAL ENCOUNTER (EMERGENCY)
Facility: MEDICAL CENTER | Age: 19
End: 2019-02-14
Attending: EMERGENCY MEDICINE
Payer: COMMERCIAL

## 2019-02-14 VITALS
DIASTOLIC BLOOD PRESSURE: 74 MMHG | SYSTOLIC BLOOD PRESSURE: 147 MMHG | RESPIRATION RATE: 16 BRPM | HEIGHT: 65 IN | TEMPERATURE: 97.2 F | WEIGHT: 154 LBS | HEART RATE: 70 BPM | BODY MASS INDEX: 25.66 KG/M2 | OXYGEN SATURATION: 98 %

## 2019-02-14 DIAGNOSIS — S16.1XXA STRAIN OF NECK MUSCLE, INITIAL ENCOUNTER: ICD-10-CM

## 2019-02-14 DIAGNOSIS — S50.11XA CONTUSION OF RIGHT FOREARM, INITIAL ENCOUNTER: ICD-10-CM

## 2019-02-14 DIAGNOSIS — S39.012A BACK STRAIN, INITIAL ENCOUNTER: ICD-10-CM

## 2019-02-14 DIAGNOSIS — S63.501A SPRAIN OF RIGHT WRIST, INITIAL ENCOUNTER: ICD-10-CM
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
## 2019-02-14 PROCEDURE — 73090 X-RAY EXAM OF FOREARM: CPT | Mod: RT

## 2019-02-14 PROCEDURE — 700102 HCHG RX REV CODE 250 W/ 637 OVERRIDE(OP): Performed by: EMERGENCY MEDICINE

## 2019-02-14 PROCEDURE — 307740 HCHG GREEN TRAUMA TEAM SERVICES

## 2019-02-14 PROCEDURE — 71045 X-RAY EXAM CHEST 1 VIEW: CPT

## 2019-02-14 PROCEDURE — 72128 CT CHEST SPINE W/O DYE: CPT

## 2019-02-14 PROCEDURE — 72131 CT LUMBAR SPINE W/O DYE: CPT

## 2019-02-14 PROCEDURE — A9270 NON-COVERED ITEM OR SERVICE: HCPCS | Performed by: EMERGENCY MEDICINE

## 2019-02-14 PROCEDURE — 99284 EMERGENCY DEPT VISIT MOD MDM: CPT

## 2019-02-14 PROCEDURE — 72125 CT NECK SPINE W/O DYE: CPT

## 2019-02-14 RX ORDER — ACETAMINOPHEN 500 MG
1000 TABLET ORAL ONCE
Status: COMPLETED | OUTPATIENT
Start: 2019-02-14 | End: 2019-02-14

## 2019-02-14 RX ADMIN — ACETAMINOPHEN 1000 MG: 500 TABLET, FILM COATED ORAL at 21:45
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
## 2019-02-15 NOTE — ED NOTES
Pt provided with discharge instructions, education, and work excuse. Verbalizes understanding. Denies any questions or concerns at this time. Ambulates independently to lobby.
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
## 2019-07-19 PROBLEM — R53.83 OTHER FATIGUE: Status: ACTIVE | Noted: 2019-07-19

## 2019-07-19 PROBLEM — R11.0 NAUSEA: Status: ACTIVE | Noted: 2019-07-19

## 2019-07-19 PROBLEM — G47.00 INSOMNIA: Status: ACTIVE | Noted: 2019-07-19

## 2019-07-19 PROBLEM — F41.9 ANXIETY: Status: ACTIVE | Noted: 2019-07-19

## 2019-07-19 PROBLEM — M79.671 RIGHT FOOT PAIN: Status: ACTIVE | Noted: 2019-07-19

## 2019-07-19 PROBLEM — M54.50 BILATERAL LOW BACK PAIN: Status: ACTIVE | Noted: 2019-07-19

## 2019-07-19 PROBLEM — Z56.6 STRESS AT WORK: Status: ACTIVE | Noted: 2019-07-19

## 2019-07-19 PROBLEM — Z76.89 ESTABLISHING CARE WITH NEW DOCTOR, ENCOUNTER FOR: Status: ACTIVE | Noted: 2019-07-19

## 2019-07-19 PROBLEM — F32.A DEPRESSION: Status: ACTIVE | Noted: 2019-07-19

## 2019-07-19 PROBLEM — R68.89 UNINTENTIONAL WEIGHT CHANGE: Status: ACTIVE | Noted: 2019-07-19

## 2019-08-22 PROBLEM — Z09 FOLLOW UP: Status: ACTIVE | Noted: 2019-08-22

## 2019-09-09 PROBLEM — R09.89 TENDER LYMPH NODE: Status: ACTIVE | Noted: 2019-09-09

## 2019-09-09 PROBLEM — J02.9 SORE THROAT: Status: ACTIVE | Noted: 2019-09-09

## 2019-09-09 PROBLEM — R51.9 NONINTRACTABLE HEADACHE: Status: ACTIVE | Noted: 2019-09-09

## 2019-09-09 PROBLEM — R50.9 FEELS FEVERISH: Status: ACTIVE | Noted: 2019-09-09

## 2019-09-09 PROBLEM — J01.00 ACUTE NON-RECURRENT MAXILLARY SINUSITIS: Status: ACTIVE | Noted: 2019-09-09

## 2019-09-09 PROBLEM — J02.9 PHARYNGITIS: Status: ACTIVE | Noted: 2019-09-09

## 2019-09-09 PROBLEM — J30.2 SEASONAL ALLERGIES: Status: ACTIVE | Noted: 2019-09-09

## 2020-04-01 PROBLEM — F41.3 OTHER MIXED ANXIETY DISORDERS: Status: ACTIVE | Noted: 2019-07-19

## 2020-04-01 PROBLEM — J45.909 ASTHMA: Status: ACTIVE | Noted: 2020-04-01
